# Patient Record
Sex: FEMALE | Race: BLACK OR AFRICAN AMERICAN | Employment: FULL TIME | URBAN - NONMETROPOLITAN AREA
[De-identification: names, ages, dates, MRNs, and addresses within clinical notes are randomized per-mention and may not be internally consistent; named-entity substitution may affect disease eponyms.]

---

## 2018-03-16 ENCOUNTER — HOSPITAL ENCOUNTER (OUTPATIENT)
Dept: ULTRASOUND IMAGING | Age: 28
Discharge: HOME | End: 2018-03-16
Payer: COMMERCIAL

## 2018-03-16 DIAGNOSIS — N93.8 DUB (DYSFUNCTIONAL UTERINE BLEEDING): ICD-10-CM

## 2018-03-16 PROCEDURE — 76830 TRANSVAGINAL US NON-OB: CPT

## 2019-01-24 ENCOUNTER — HOSPITAL ENCOUNTER (EMERGENCY)
Age: 29
Discharge: HOME | End: 2019-01-24
Payer: COMMERCIAL

## 2019-01-24 VITALS
HEART RATE: 76 BPM | RESPIRATION RATE: 18 BRPM | WEIGHT: 220 LBS | TEMPERATURE: 98.6 F | BODY MASS INDEX: 34.53 KG/M2 | DIASTOLIC BLOOD PRESSURE: 58 MMHG | OXYGEN SATURATION: 98 % | HEIGHT: 67 IN | SYSTOLIC BLOOD PRESSURE: 122 MMHG

## 2019-01-24 DIAGNOSIS — R11.0 NAUSEA: ICD-10-CM

## 2019-01-24 DIAGNOSIS — K59.00 CONSTIPATION, UNSPECIFIED CONSTIPATION TYPE: Primary | ICD-10-CM

## 2019-01-24 PROCEDURE — 99213 OFFICE O/P EST LOW 20 MIN: CPT | Performed by: NURSE PRACTITIONER

## 2019-01-24 PROCEDURE — 99212 OFFICE O/P EST SF 10 MIN: CPT

## 2019-01-24 RX ORDER — DOCUSATE SODIUM 100 MG/1
100 CAPSULE, LIQUID FILLED ORAL 2 TIMES DAILY PRN
Qty: 60 CAPSULE | Refills: 0 | Status: SHIPPED | OUTPATIENT
Start: 2019-01-24

## 2019-01-24 RX ORDER — NORGESTIMATE AND ETHINYL ESTRADIOL 7DAYSX3 28
KIT ORAL
COMMUNITY

## 2019-01-24 RX ORDER — ONDANSETRON 4 MG/1
4 TABLET, ORALLY DISINTEGRATING ORAL EVERY 8 HOURS PRN
Qty: 20 TABLET | Refills: 0 | Status: SHIPPED | OUTPATIENT
Start: 2019-01-24

## 2019-01-24 RX ORDER — POLYETHYLENE GLYCOL 3350 17 G/17G
17 POWDER, FOR SOLUTION ORAL DAILY PRN
Qty: 527 G | Refills: 1 | Status: SHIPPED | OUTPATIENT
Start: 2019-01-24 | End: 2019-02-23

## 2019-01-24 RX ORDER — METFORMIN HYDROCHLORIDE 500 MG/1
500 TABLET, EXTENDED RELEASE ORAL
COMMUNITY

## 2019-01-24 RX ORDER — DIVALPROEX SODIUM 125 MG/1
125 TABLET, DELAYED RELEASE ORAL 3 TIMES DAILY
COMMUNITY

## 2019-01-24 ASSESSMENT — ENCOUNTER SYMPTOMS
DIARRHEA: 0
SHORTNESS OF BREATH: 0
CONSTIPATION: 1
SORE THROAT: 0
VOMITING: 1
SINUS PRESSURE: 0
NAUSEA: 1

## 2019-04-16 ENCOUNTER — HOSPITAL ENCOUNTER (OUTPATIENT)
Age: 29
Setting detail: SPECIMEN
Discharge: HOME | End: 2019-04-16
Payer: COMMERCIAL

## 2019-04-16 LAB
ABSOLUTE EOS #: 0.17 K/UL (ref 0–0.44)
ABSOLUTE IMMATURE GRANULOCYTE: 0.03 K/UL (ref 0–0.3)
ABSOLUTE LYMPH #: 2.59 K/UL (ref 1.1–3.7)
ABSOLUTE MONO #: 0.41 K/UL (ref 0.1–1.2)
BASOPHILS # BLD: 0 % (ref 0–2)
BASOPHILS ABSOLUTE: <0.03 K/UL (ref 0–0.2)
DIFFERENTIAL TYPE: ABNORMAL
EOSINOPHILS RELATIVE PERCENT: 2 % (ref 1–4)
HCT VFR BLD CALC: 38.5 % (ref 36.3–47.1)
HEMOGLOBIN: 11.6 G/DL (ref 11.9–15.1)
IMMATURE GRANULOCYTES: 0 %
LYMPHOCYTES # BLD: 28 % (ref 24–43)
MCH RBC QN AUTO: 22 PG (ref 25.2–33.5)
MCHC RBC AUTO-ENTMCNC: 30.1 G/DL (ref 28.4–34.8)
MCV RBC AUTO: 72.9 FL (ref 82.6–102.9)
MONOCYTES # BLD: 4 % (ref 3–12)
NRBC AUTOMATED: 0 PER 100 WBC
PDW BLD-RTO: 15.6 % (ref 11.8–14.4)
PLATELET # BLD: 283 K/UL (ref 138–453)
PLATELET ESTIMATE: ABNORMAL
PMV BLD AUTO: 9.8 FL (ref 8.1–13.5)
RBC # BLD: 5.28 M/UL (ref 3.95–5.11)
RBC # BLD: ABNORMAL 10*6/UL
SEG NEUTROPHILS: 66 % (ref 36–65)
SEGMENTED NEUTROPHILS ABSOLUTE COUNT: 6.03 K/UL (ref 1.5–8.1)
WBC # BLD: 9.2 K/UL (ref 3.5–11.3)
WBC # BLD: ABNORMAL 10*3/UL

## 2019-04-17 LAB
ALBUMIN SERPL-MCNC: 3.9 G/DL (ref 3.5–5.2)
ALBUMIN/GLOBULIN RATIO: 1.3 (ref 1–2.5)
ALP BLD-CCNC: 70 U/L (ref 35–104)
ALT SERPL-CCNC: 24 U/L (ref 5–33)
ANION GAP SERPL CALCULATED.3IONS-SCNC: 14 MMOL/L (ref 9–17)
AST SERPL-CCNC: 19 U/L
BILIRUB SERPL-MCNC: 0.23 MG/DL (ref 0.3–1.2)
BUN BLDV-MCNC: 7 MG/DL (ref 6–20)
BUN/CREAT BLD: ABNORMAL (ref 9–20)
CALCIUM SERPL-MCNC: 8.9 MG/DL (ref 8.6–10.4)
CHLORIDE BLD-SCNC: 105 MMOL/L (ref 98–107)
CO2: 21 MMOL/L (ref 20–31)
COMPLEMENT C4: 37 MG/DL (ref 10–40)
CREAT SERPL-MCNC: 0.63 MG/DL (ref 0.5–0.9)
GFR AFRICAN AMERICAN: >60 ML/MIN
GFR NON-AFRICAN AMERICAN: >60 ML/MIN
GFR SERPL CREATININE-BSD FRML MDRD: ABNORMAL ML/MIN/{1.73_M2}
GFR SERPL CREATININE-BSD FRML MDRD: ABNORMAL ML/MIN/{1.73_M2}
GLUCOSE BLD-MCNC: 73 MG/DL (ref 70–99)
POTASSIUM SERPL-SCNC: 4.4 MMOL/L (ref 3.7–5.3)
SEDIMENTATION RATE, ERYTHROCYTE: 10 MM (ref 0–20)
SODIUM BLD-SCNC: 140 MMOL/L (ref 135–144)
TOTAL PROTEIN: 6.9 G/DL (ref 6.4–8.3)

## 2019-04-19 LAB — COMPLEMENT TOTAL (CH50): 176 CAE UNITS (ref 60–144)

## 2021-03-02 ENCOUNTER — HOSPITAL ENCOUNTER (OUTPATIENT)
Age: 31
Setting detail: SPECIMEN
Discharge: HOME | End: 2021-03-02
Payer: COMMERCIAL

## 2021-03-05 LAB
CULTURE: ABNORMAL
DIRECT EXAM: ABNORMAL
DIRECT EXAM: ABNORMAL
Lab: ABNORMAL
SPECIMEN DESCRIPTION: ABNORMAL

## 2021-05-22 ENCOUNTER — HOSPITAL ENCOUNTER (EMERGENCY)
Age: 31
Discharge: HOME | End: 2021-05-22
Attending: EMERGENCY MEDICINE
Payer: COMMERCIAL

## 2021-05-22 VITALS
TEMPERATURE: 97.9 F | BODY MASS INDEX: 35.47 KG/M2 | RESPIRATION RATE: 18 BRPM | OXYGEN SATURATION: 99 % | WEIGHT: 226 LBS | DIASTOLIC BLOOD PRESSURE: 76 MMHG | HEIGHT: 67 IN | HEART RATE: 77 BPM | SYSTOLIC BLOOD PRESSURE: 131 MMHG

## 2021-05-22 DIAGNOSIS — L02.411 ABSCESS OF AXILLA, RIGHT: Primary | ICD-10-CM

## 2021-05-22 DIAGNOSIS — L73.2 AXILLARY HIDRADENITIS SUPPURATIVA: ICD-10-CM

## 2021-05-22 LAB
ANION GAP SERPL CALCULATED.3IONS-SCNC: 7 MEQ/L (ref 8–16)
BASOPHILS # BLD: 0.1 %
BASOPHILS ABSOLUTE: 0 THOU/MM3 (ref 0–0.1)
BUN BLDV-MCNC: 8 MG/DL (ref 7–22)
CALCIUM SERPL-MCNC: 8.9 MG/DL (ref 8.5–10.5)
CHLORIDE BLD-SCNC: 106 MEQ/L (ref 98–111)
CO2: 25 MEQ/L (ref 23–33)
CREAT SERPL-MCNC: 0.8 MG/DL (ref 0.4–1.2)
EOSINOPHIL # BLD: 4.6 %
EOSINOPHILS ABSOLUTE: 0.7 THOU/MM3 (ref 0–0.4)
ERYTHROCYTE [DISTWIDTH] IN BLOOD BY AUTOMATED COUNT: 15 % (ref 11.5–14.5)
ERYTHROCYTE [DISTWIDTH] IN BLOOD BY AUTOMATED COUNT: 39.5 FL (ref 35–45)
GFR SERPL CREATININE-BSD FRML MDRD: > 90 ML/MIN/1.73M2
GLUCOSE BLD-MCNC: 83 MG/DL (ref 70–108)
HCT VFR BLD CALC: 39.7 % (ref 37–47)
HEMOGLOBIN: 12.1 GM/DL (ref 12–16)
IMMATURE GRANS (ABS): 0.05 THOU/MM3 (ref 0–0.07)
IMMATURE GRANULOCYTES: 0.3 %
LYMPHOCYTES # BLD: 20.9 %
LYMPHOCYTES ABSOLUTE: 3.2 THOU/MM3 (ref 1–4.8)
MCH RBC QN AUTO: 22.5 PG (ref 26–33)
MCHC RBC AUTO-ENTMCNC: 30.5 GM/DL (ref 32.2–35.5)
MCV RBC AUTO: 73.9 FL (ref 81–99)
MONOCYTES # BLD: 5.7 %
MONOCYTES ABSOLUTE: 0.9 THOU/MM3 (ref 0.4–1.3)
NUCLEATED RED BLOOD CELLS: 0 /100 WBC
OSMOLALITY CALCULATION: 273.1 MOSMOL/KG (ref 275–300)
PLATELET # BLD: 306 THOU/MM3 (ref 130–400)
PMV BLD AUTO: 9.2 FL (ref 9.4–12.4)
POTASSIUM SERPL-SCNC: 4.4 MEQ/L (ref 3.5–5.2)
RBC # BLD: 5.37 MILL/MM3 (ref 4.2–5.4)
SEG NEUTROPHILS: 68.4 %
SEGMENTED NEUTROPHILS ABSOLUTE COUNT: 10.5 THOU/MM3 (ref 1.8–7.7)
SODIUM BLD-SCNC: 138 MEQ/L (ref 135–145)
WBC # BLD: 15.3 THOU/MM3 (ref 4.8–10.8)

## 2021-05-22 PROCEDURE — 80048 BASIC METABOLIC PNL TOTAL CA: CPT

## 2021-05-22 PROCEDURE — 10060 I&D ABSCESS SIMPLE/SINGLE: CPT

## 2021-05-22 PROCEDURE — 36415 COLL VENOUS BLD VENIPUNCTURE: CPT

## 2021-05-22 PROCEDURE — 99283 EMERGENCY DEPT VISIT LOW MDM: CPT

## 2021-05-22 PROCEDURE — 85025 COMPLETE CBC W/AUTO DIFF WBC: CPT

## 2021-05-22 PROCEDURE — 6370000000 HC RX 637 (ALT 250 FOR IP): Performed by: EMERGENCY MEDICINE

## 2021-05-22 RX ORDER — CLINDAMYCIN HYDROCHLORIDE 150 MG/1
300 CAPSULE ORAL ONCE
Status: COMPLETED | OUTPATIENT
Start: 2021-05-22 | End: 2021-05-22

## 2021-05-22 RX ORDER — HYDROCODONE BITARTRATE AND ACETAMINOPHEN 5; 325 MG/1; MG/1
1 TABLET ORAL ONCE
Status: COMPLETED | OUTPATIENT
Start: 2021-05-22 | End: 2021-05-22

## 2021-05-22 RX ORDER — CLINDAMYCIN HYDROCHLORIDE 300 MG/1
300 CAPSULE ORAL 3 TIMES DAILY
Qty: 30 CAPSULE | Refills: 0 | Status: SHIPPED | OUTPATIENT
Start: 2021-05-22 | End: 2021-06-01

## 2021-05-22 RX ADMIN — CLINDAMYCIN HYDROCHLORIDE 300 MG: 150 CAPSULE ORAL at 19:21

## 2021-05-22 RX ADMIN — HYDROCODONE BITARTRATE AND ACETAMINOPHEN 1 TABLET: 5; 325 TABLET ORAL at 18:29

## 2021-05-22 ASSESSMENT — ENCOUNTER SYMPTOMS
COUGH: 0
VOMITING: 0
ABDOMINAL PAIN: 0
SORE THROAT: 0
CONSTIPATION: 0
VOICE CHANGE: 0
DIARRHEA: 0
NAUSEA: 0
CHEST TIGHTNESS: 0
SINUS PRESSURE: 0
SHORTNESS OF BREATH: 0
TROUBLE SWALLOWING: 0
WHEEZING: 0
RHINORRHEA: 0
BACK PAIN: 0

## 2021-05-22 ASSESSMENT — PAIN SCALES - GENERAL: PAINLEVEL_OUTOF10: 4

## 2021-05-22 ASSESSMENT — PAIN DESCRIPTION - DESCRIPTORS: DESCRIPTORS: CONSTANT;TENDER

## 2021-05-22 ASSESSMENT — PAIN DESCRIPTION - PAIN TYPE: TYPE: ACUTE PAIN

## 2021-05-22 ASSESSMENT — PAIN DESCRIPTION - FREQUENCY: FREQUENCY: CONTINUOUS

## 2021-05-22 NOTE — ED PROVIDER NOTES
[facility] [address]        CHIEF COMPLAINT    Chief Complaint   Patient presents with    Abscess       Nurses Notes reviewed and I agree except as noted in the HPI. HPI    Billy Andino is a 27 y.o. female who presents for evaluation of abscess on the left axilla which developed overnight. Patient had history of hidradenitis suppurativa. Patient went to the [facility] clinic and was directed here to be evaluated's the axilla had some redness. Patient did not have any fever, no chills, no shortness of breath, no chest pain. Patient is currently on doxycycline. REVIEW OF SYSTEMS    Review of Systems   Constitutional: Negative for appetite change, chills, diaphoresis, fatigue and fever. HENT: Negative for congestion, ear pain, postnasal drip, rhinorrhea, sinus pressure, sneezing, sore throat, trouble swallowing and voice change. Respiratory: Negative for cough, chest tightness, shortness of breath and wheezing. Cardiovascular: Negative for chest pain, palpitations and leg swelling. Gastrointestinal: Negative for abdominal pain, constipation, diarrhea, nausea and vomiting. Musculoskeletal: Negative for arthralgias, back pain, joint swelling, myalgias, neck pain and neck stiffness. Right axillary abscess   Neurological: Negative for dizziness, syncope, weakness, light-headedness, numbness and headaches. PAST MEDICAL HISTORY     has a past medical history of Anxiety, Bipolar 1 disorder ([address] Diabetes mellitus ([address] and Headache(784.0). SURGICAL HISTORY   has no past surgical history on file.     CURRENT MEDICATIONS    Previous Medications    DIVALPROEX (DEPAKOTE) 125 MG DR TABLET    Take 125 mg by mouth 3 times daily    DOCUSATE SODIUM (COLACE) 100 MG CAPSULE    Take 1 capsule by mouth 2 times daily as needed for Constipation    METFORMIN (GLUCOPHAGE-XR) 500 MG EXTENDED RELEASE TABLET    Take 500 mg by mouth daily (with breakfast)    NORGESTIM-ETH ESTRAD TRIPHASIC (TRI-SPRINTEC) 0.18/0.215/0.25 MG-35 MCG TABS    Take by mouth    ONDANSETRON (ZOFRAN ODT) 4 MG DISINTEGRATING TABLET    Take 1 tablet by mouth every 8 hours as needed for Nausea or Vomiting       ALLERGIES    has No Known Allergies. FAMILY HISTORY    She indicated that her mother is alive. She indicated that her father is alive. family history includes Hypertension in her mother; Thyroid Disease in her mother. SOCIAL HISTORY     reports that she has never smoked. She has never used smokeless tobacco. She reports current alcohol use. She reports that she does not use drugs. PHYSICAL EXAM      INITIAL VITALS: /76   Pulse 77   Temp 97.9 °F (36.6 °C) (Oral)   Resp 18   Ht 5' 7\" (1.702 m)   Wt 226 lb (102.5 kg)   SpO2 99%   BMI 35.40 kg/m² Estimated body mass index is 35.4 kg/m² as calculated from the following:    Height as of this encounter: 5' 7\" (1.702 m). Weight as of this encounter: 226 lb (102.5 kg). Physical Exam  Vitals reviewed. Constitutional:       Appearance: She is well-developed. HENT:      Head: Normocephalic and atraumatic. Right Ear: External ear normal.      Left Ear: External ear normal.      Nose: Nose normal.   Eyes:      General: No scleral icterus. Conjunctiva/sclera: Conjunctivae normal.      Pupils: Pupils are equal, round, and reactive to light. Neck:      Thyroid: No thyromegaly. Vascular: No JVD. Cardiovascular:      Rate and Rhythm: Normal rate and regular rhythm. Heart sounds: No murmur heard. No friction rub. Pulmonary:      Effort: Pulmonary effort is normal.      Breath sounds: Normal breath sounds. No wheezing or rales. Chest:      Chest wall: No tenderness. Abdominal:      General: Bowel sounds are normal.      Palpations: Abdomen is soft. There is no mass. Tenderness: There is no abdominal tenderness.    Musculoskeletal:      Cervical back: Normal range of motion and neck supple. Comments: Right axilla showed a 1 inch x 1-1/2 inch fluctuant mass consistent with an abscess   Lymphadenopathy:      Cervical: No cervical adenopathy. Skin:     Findings: No rash. Neurological:      Mental Status: She is alert and oriented to person, place, and time. Psychiatric:         Behavior: Behavior is cooperative. MEDICAL DECISION MAKING    DIFFERENTIAL DIAGNOSIS:  Axillary abscess, history of hidradenitis suppurativa      DIAGNOSTIC RESULTS      RADIOLOGY:    No orders to display       LABS:   Labs Reviewed   CBC WITH AUTO DIFFERENTIAL - Abnormal; Notable for the following components:       Result Value    WBC 15.3 (*)     MCV 73.9 (*)     MCH 22.5 (*)     MCHC 30.5 (*)     RDW-CV 15.0 (*)     MPV 9.2 (*)     Segs Absolute 10.5 (*)     Eosinophils Absolute 0.7 (*)     All other components within normal limits   ANION GAP - Abnormal; Notable for the following components:    Anion Gap 7.0 (*)     All other components within normal limits   OSMOLALITY - Abnormal; Notable for the following components:    Osmolality Calc 273.1 (*)     All other components within normal limits   BASIC METABOLIC PANEL   GLOMERULAR FILTRATION RATE, ESTIMATED     All other unresulted laboratory test above are normal:    Vitals:    Vitals:    05/22/21 1531   BP: 131/76   Pulse: 77   Resp: 18   Temp: 97.9 °F (36.6 °C)   TempSrc: Oral   SpO2: 99%   Weight: 226 lb (102.5 kg)   Height: 5' 7\" (1.702 m)       EMERGENCY DEPARTMENT COURSE:    Medications   vancomycin (VANCOCIN) 2,000 mg in dextrose 5 % 500 mL IVPB (has no administration in time range)   clindamycin (CLEOCIN) capsule 300 mg (has no administration in time range)   HYDROcodone-acetaminophen (NORCO) 5-325 MG per tablet 1 tablet (1 tablet Oral Given 5/22/21 1829)       The pt was seen and evaluated by me. Within the department, I observed the pt's vitalsigns to be within acceptable range.  Laboratory studies were performed, results were reviewed with the patient. Within the department, the pt was treated with clindamycin and Norco as the patient refusing vancomycin because of time constraints, she has to  children. I observed the pt's condition to be hemodynamically stable during the duration of their stay. I explained my proposed course of treatment to the pt, and they were amenable to my decision. They were discharged home, and they will return to the ED if their symptoms become more severein nature, or otherwise change. Controlled Substances Monitoring:        CRITICAL CARE:   None. CONSULTS:  None    PROCEDURES:Incision and Drainage Procedure Note done by me: Indication: axillary abscess  Procedure: The patient was positioned appropriately and the skin over the incision site was prepped with betadine and draped in a sterile fashion. Local anesthesia was obtained by infiltration using 1% Lidocaine without epinephrine. A 1 cm incision was then made over the greatest area of fluctuance and immediately creamy and yellow material flowed out freely and the rest by gentle compression. The drainage cavity was then packed with sterile Iodoform gauze and dressed with a sterile dressing, gauze and tape. The patient tolerated the procedure well. Complications: None      FINAL IMPRESSION       1. Abscess of axilla, right    2. Axillary hidradenitis suppurativa          DISPOSITION/PLAN  PATIENT REFERRED TO:  Washington Mccarthy, APRN - CNP  [address]  [phone]    Schedule an appointment as soon as possible for a visit in 3 days      DISCHARGE MEDICATIONS:  New Prescriptions    CLINDAMYCIN (CLEOCIN) 300 MG CAPSULE    Take 1 capsule by mouth 3 times daily for 10 days 1 tablet 3 times a day for 10 days         (Please note that portions of this note were completed with a voice recognition program and electronically transcribed.   Efforts were [facility] edit the dictations but occasionally words are mis-transcribed . The transcription may contain errors not detected in proofreading.   This transcription was electronically signed.)     05/22/21 7:15 PM      Brandi Ohara MD      Emergency room physician           Brandi Ohara MD  05/22/21 6162

## 2021-05-22 NOTE — ED NOTES
Pt presents to the ed with c/o abscess to the inner right armpit area. PT states that she was sent here from [facility] for IV abts.      Erich Vargas [place]  59/66/15 7878

## 2021-05-22 NOTE — ED NOTES
Pt refusing IV antibiotics as she is unable to wait for the infusion time. Dr Bindu Baldwin made aware.       Katey Butler RN  05/22/21 3297

## 2021-06-24 ENCOUNTER — HOSPITAL ENCOUNTER (OUTPATIENT)
Age: 31
Setting detail: SPECIMEN
Discharge: HOME | End: 2021-06-24
Payer: COMMERCIAL

## 2021-06-27 LAB
CULTURE: ABNORMAL
DIRECT EXAM: ABNORMAL
Lab: ABNORMAL
SPECIMEN DESCRIPTION: ABNORMAL

## 2021-07-30 ENCOUNTER — HOSPITAL ENCOUNTER (OUTPATIENT)
Age: 31
Setting detail: SPECIMEN
Discharge: HOME | End: 2021-07-30
Payer: COMMERCIAL

## 2021-07-31 LAB
DIRECT EXAM: NORMAL
Lab: NORMAL
SPECIMEN DESCRIPTION: NORMAL

## 2021-08-03 LAB
CHLAMYDIA BY THIN PREP: NEGATIVE
HPV SAMPLE: NORMAL
HPV, GENOTYPE 16: NOT DETECTED
HPV, GENOTYPE 18: NOT DETECTED
HPV, HIGH RISK OTHER: NOT DETECTED
HPV, INTERPRETATION: NORMAL
N. GONORRHOEAE DNA, THIN PREP: NEGATIVE
SPECIMEN DESCRIPTION: NORMAL
SPECIMEN DESCRIPTION: NORMAL

## 2021-08-04 ENCOUNTER — HOSPITAL ENCOUNTER (OUTPATIENT)
Age: 31
Setting detail: SPECIMEN
Discharge: HOME | End: 2021-08-04
Payer: COMMERCIAL

## 2021-08-04 LAB
ABSOLUTE EOS #: 0.25 K/UL (ref 0–0.44)
ABSOLUTE IMMATURE GRANULOCYTE: 0.03 K/UL (ref 0–0.3)
ABSOLUTE LYMPH #: 2.61 K/UL (ref 1.1–3.7)
ABSOLUTE MONO #: 0.51 K/UL (ref 0.1–1.2)
ALBUMIN SERPL-MCNC: 3.9 G/DL (ref 3.5–5.2)
ALBUMIN/GLOBULIN RATIO: 1.1 (ref 1–2.5)
ALP BLD-CCNC: 77 U/L (ref 35–104)
ALT SERPL-CCNC: 18 U/L (ref 5–33)
ANION GAP SERPL CALCULATED.3IONS-SCNC: 13 MMOL/L (ref 9–17)
AST SERPL-CCNC: 19 U/L
BASOPHILS # BLD: 0 % (ref 0–2)
BASOPHILS ABSOLUTE: <0.03 K/UL (ref 0–0.2)
BILIRUB SERPL-MCNC: 0.42 MG/DL (ref 0.3–1.2)
BUN BLDV-MCNC: 11 MG/DL (ref 6–20)
BUN/CREAT BLD: NORMAL (ref 9–20)
CALCIUM SERPL-MCNC: 8.9 MG/DL (ref 8.6–10.4)
CHLORIDE BLD-SCNC: 105 MMOL/L (ref 98–107)
CHOLESTEROL/HDL RATIO: 4.3
CHOLESTEROL: 178 MG/DL
CO2: 21 MMOL/L (ref 20–31)
CREAT SERPL-MCNC: 0.73 MG/DL (ref 0.5–0.9)
DIFFERENTIAL TYPE: ABNORMAL
EOSINOPHILS RELATIVE PERCENT: 3 % (ref 1–4)
GFR AFRICAN AMERICAN: >60 ML/MIN
GFR NON-AFRICAN AMERICAN: >60 ML/MIN
GFR SERPL CREATININE-BSD FRML MDRD: NORMAL ML/MIN/{1.73_M2}
GFR SERPL CREATININE-BSD FRML MDRD: NORMAL ML/MIN/{1.73_M2}
GLUCOSE BLD-MCNC: 95 MG/DL (ref 70–99)
HCT VFR BLD CALC: 41.3 % (ref 36.3–47.1)
HDLC SERPL-MCNC: 41 MG/DL
HEMOGLOBIN: 12.3 G/DL (ref 11.9–15.1)
IMMATURE GRANULOCYTES: 0 %
LDL CHOLESTEROL: 126 MG/DL (ref 0–130)
LYMPHOCYTES # BLD: 26 % (ref 24–43)
MCH RBC QN AUTO: 21.8 PG (ref 25.2–33.5)
MCHC RBC AUTO-ENTMCNC: 29.8 G/DL (ref 28.4–34.8)
MCV RBC AUTO: 73.1 FL (ref 82.6–102.9)
MONOCYTES # BLD: 5 % (ref 3–12)
NRBC AUTOMATED: 0 PER 100 WBC
PDW BLD-RTO: 13.9 % (ref 11.8–14.4)
PLATELET # BLD: 378 K/UL (ref 138–453)
PLATELET ESTIMATE: ABNORMAL
PMV BLD AUTO: 9.6 FL (ref 8.1–13.5)
POTASSIUM SERPL-SCNC: 4.1 MMOL/L (ref 3.7–5.3)
RBC # BLD: 5.65 M/UL (ref 3.95–5.11)
RBC # BLD: ABNORMAL 10*6/UL
SEG NEUTROPHILS: 66 % (ref 36–65)
SEGMENTED NEUTROPHILS ABSOLUTE COUNT: 6.51 K/UL (ref 1.5–8.1)
SODIUM BLD-SCNC: 139 MMOL/L (ref 135–144)
TOTAL PROTEIN: 7.3 G/DL (ref 6.4–8.3)
TRIGL SERPL-MCNC: 57 MG/DL
TSH SERPL DL<=0.05 MIU/L-ACNC: 1.22 MIU/L (ref 0.3–5)
VLDLC SERPL CALC-MCNC: NORMAL MG/DL (ref 1–30)
WBC # BLD: 9.9 K/UL (ref 3.5–11.3)
WBC # BLD: ABNORMAL 10*3/UL

## 2021-08-05 LAB
ESTIMATED AVERAGE GLUCOSE: 134 MG/DL
HBA1C MFR BLD: 6.3 % (ref 4–6)

## 2021-08-11 LAB — CYTOLOGY REPORT: NORMAL

## 2022-03-14 ENCOUNTER — HOSPITAL ENCOUNTER (OUTPATIENT)
Age: 32
Setting detail: SPECIMEN
Discharge: HOME | End: 2022-03-14

## 2022-03-15 LAB
HBV SURFACE AB TITR SER: <3.5 MIU/ML
HEPATITIS B CORE TOTAL ANTIBODY: NONREACTIVE
HEPATITIS B SURFACE ANTIGEN: NONREACTIVE

## 2022-03-17 LAB
QUANTI TB GOLD PLUS: NEGATIVE
QUANTI TB1 MINUS NIL: 0 IU/ML (ref 0–0.34)
QUANTI TB2 MINUS NIL: 0.01 IU/ML (ref 0–0.34)
QUANTIFERON MITOGEN: >10 IU/ML
QUANTIFERON NIL: 0.01 IU/ML

## 2022-11-14 ENCOUNTER — HOSPITAL ENCOUNTER (OUTPATIENT)
Age: 32
Setting detail: SPECIMEN
Discharge: HOME | End: 2022-11-14

## 2022-11-14 LAB
ABSOLUTE EOS #: 0.26 K/UL (ref 0–0.44)
ABSOLUTE IMMATURE GRANULOCYTE: 0.03 K/UL (ref 0–0.3)
ABSOLUTE LYMPH #: 3.08 K/UL (ref 1.1–3.7)
ABSOLUTE MONO #: 0.41 K/UL (ref 0.1–1.2)
ALBUMIN SERPL-MCNC: 3.7 G/DL (ref 3.5–5.2)
ALBUMIN/GLOBULIN RATIO: 1.2 (ref 1–2.5)
ALP BLD-CCNC: 65 U/L (ref 35–104)
ALT SERPL-CCNC: 20 U/L (ref 5–33)
ANION GAP SERPL CALCULATED.3IONS-SCNC: 11 MMOL/L (ref 9–17)
AST SERPL-CCNC: 17 U/L
BASOPHILS # BLD: 0 % (ref 0–2)
BASOPHILS ABSOLUTE: <0.03 K/UL (ref 0–0.2)
BILIRUB SERPL-MCNC: 0.2 MG/DL (ref 0.3–1.2)
BUN BLDV-MCNC: 8 MG/DL (ref 6–20)
CALCIUM SERPL-MCNC: 8.6 MG/DL (ref 8.6–10.4)
CHLORIDE BLD-SCNC: 104 MMOL/L (ref 98–107)
CHOLESTEROL/HDL RATIO: 3.9
CHOLESTEROL: 154 MG/DL
CO2: 23 MMOL/L (ref 20–31)
CREAT SERPL-MCNC: 0.75 MG/DL (ref 0.5–0.9)
EOSINOPHILS RELATIVE PERCENT: 3 % (ref 1–4)
FERRITIN: 16 NG/ML (ref 13–150)
FOLATE: 2.6 NG/ML
GFR SERPL CREATININE-BSD FRML MDRD: >60 ML/MIN/1.73M2
GLUCOSE BLD-MCNC: 96 MG/DL (ref 70–99)
HCT VFR BLD CALC: 37.4 % (ref 36.3–47.1)
HDLC SERPL-MCNC: 40 MG/DL
HEMOGLOBIN: 11.5 G/DL (ref 11.9–15.1)
IMMATURE GRANULOCYTES: 0 %
IRON SATURATION: 19 % (ref 20–55)
IRON: 59 UG/DL (ref 37–145)
LDL CHOLESTEROL: 98 MG/DL (ref 0–130)
LYMPHOCYTES # BLD: 37 % (ref 24–43)
MAGNESIUM: 1.8 MG/DL (ref 1.6–2.6)
MCH RBC QN AUTO: 22.4 PG (ref 25.2–33.5)
MCHC RBC AUTO-ENTMCNC: 30.7 G/DL (ref 28.4–34.8)
MCV RBC AUTO: 72.9 FL (ref 82.6–102.9)
MONOCYTES # BLD: 5 % (ref 3–12)
NRBC AUTOMATED: 0 PER 100 WBC
PDW BLD-RTO: 14 % (ref 11.8–14.4)
PLATELET # BLD: 331 K/UL (ref 138–453)
PMV BLD AUTO: 9.5 FL (ref 8.1–13.5)
POTASSIUM SERPL-SCNC: 3.8 MMOL/L (ref 3.7–5.3)
RBC # BLD: 5.13 M/UL (ref 3.95–5.11)
RBC # BLD: ABNORMAL 10*6/UL
SEG NEUTROPHILS: 55 % (ref 36–65)
SEGMENTED NEUTROPHILS ABSOLUTE COUNT: 4.61 K/UL (ref 1.5–8.1)
SODIUM BLD-SCNC: 138 MMOL/L (ref 135–144)
TOTAL IRON BINDING CAPACITY: 309 UG/DL (ref 250–450)
TOTAL PROTEIN: 6.9 G/DL (ref 6.4–8.3)
TRIGL SERPL-MCNC: 78 MG/DL
TSH SERPL DL<=0.05 MIU/L-ACNC: 2.21 UIU/ML (ref 0.3–5)
UNSATURATED IRON BINDING CAPACITY: 250 UG/DL (ref 112–347)
VITAMIN B-12: 513 PG/ML (ref 232–1245)
WBC # BLD: 8.4 K/UL (ref 3.5–11.3)

## 2022-11-15 LAB
ESTIMATED AVERAGE GLUCOSE: 120 MG/DL
HBA1C MFR BLD: 5.8 % (ref 4–6)
VITAMIN D 25-HYDROXY: <6 NG/ML

## 2023-02-21 ENCOUNTER — HOSPITAL ENCOUNTER (OUTPATIENT)
Age: 33
Setting detail: SPECIMEN
Discharge: HOME | End: 2023-02-21

## 2023-02-23 LAB
MICROORGANISM SPEC CULT: ABNORMAL
MICROORGANISM/AGENT SPEC: ABNORMAL
SPECIMEN DESCRIPTION: ABNORMAL

## 2023-07-21 ENCOUNTER — HOSPITAL ENCOUNTER (EMERGENCY)
Age: 33
Discharge: HOME | End: 2023-07-21
Payer: OTHER MISCELLANEOUS

## 2023-07-21 VITALS
SYSTOLIC BLOOD PRESSURE: 136 MMHG | HEART RATE: 86 BPM | TEMPERATURE: 98.5 F | HEIGHT: 66 IN | RESPIRATION RATE: 18 BRPM | WEIGHT: 230 LBS | BODY MASS INDEX: 36.96 KG/M2 | OXYGEN SATURATION: 99 % | DIASTOLIC BLOOD PRESSURE: 91 MMHG

## 2023-07-21 DIAGNOSIS — S29.012A UPPER BACK STRAIN, INITIAL ENCOUNTER: ICD-10-CM

## 2023-07-21 DIAGNOSIS — S16.1XXA ACUTE STRAIN OF NECK MUSCLE, INITIAL ENCOUNTER: ICD-10-CM

## 2023-07-21 DIAGNOSIS — S63.502A SPRAIN OF LEFT WRIST, INITIAL ENCOUNTER: ICD-10-CM
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
## 2023-07-21 PROCEDURE — 99283 EMERGENCY DEPT VISIT LOW MDM: CPT

## 2023-07-21 RX ORDER — NAPROXEN 500 MG/1
500 TABLET ORAL 2 TIMES DAILY
Qty: 20 TABLET | Refills: 0 | Status: SHIPPED | OUTPATIENT
Start: 2023-07-21

## 2023-07-21 RX ORDER — ADALIMUMAB 40MG/0.4ML
KIT SUBCUTANEOUS
COMMUNITY

## 2023-07-21 RX ORDER — CYCLOBENZAPRINE HCL 10 MG
10 TABLET ORAL 3 TIMES DAILY PRN
Qty: 21 TABLET | Refills: 0 | Status: SHIPPED | OUTPATIENT
Start: 2023-07-21 | End: 2023-07-31

## 2023-07-21 ASSESSMENT — PAIN DESCRIPTION - FREQUENCY: FREQUENCY: CONTINUOUS

## 2023-07-21 ASSESSMENT — PAIN DESCRIPTION - ONSET: ONSET: ON-GOING

## 2023-07-21 ASSESSMENT — PAIN DESCRIPTION - DESCRIPTORS: DESCRIPTORS: ACHING

## 2023-07-21 ASSESSMENT — PAIN DESCRIPTION - LOCATION: LOCATION: HEAD

## 2023-07-21 ASSESSMENT — PAIN - FUNCTIONAL ASSESSMENT
PAIN_FUNCTIONAL_ASSESSMENT: 0-10
PAIN_FUNCTIONAL_ASSESSMENT: ACTIVITIES ARE NOT PREVENTED

## 2023-07-21 ASSESSMENT — LIFESTYLE VARIABLES
HOW MANY STANDARD DRINKS CONTAINING ALCOHOL DO YOU HAVE ON A TYPICAL DAY: 1 OR 2
HOW OFTEN DO YOU HAVE A DRINK CONTAINING ALCOHOL: MONTHLY OR LESS

## 2023-07-21 ASSESSMENT — PAIN DESCRIPTION - PAIN TYPE: TYPE: ACUTE PAIN

## 2023-07-21 ASSESSMENT — PAIN SCALES - GENERAL: PAINLEVEL_OUTOF10: 3

## 2023-07-21 NOTE — ED NOTES
Pt discharged in satisfactory condition, ambulated out of department with friend at this time.      Steph Bautista RN  07/21/23 0627
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
## 2023-08-15 ENCOUNTER — HOSPITAL ENCOUNTER (OUTPATIENT)
Age: 33
Setting detail: SPECIMEN
Discharge: HOME | End: 2023-08-15

## 2023-08-17 LAB
MICROORGANISM SPEC CULT: ABNORMAL
MICROORGANISM/AGENT SPEC: ABNORMAL
MICROORGANISM/AGENT SPEC: ABNORMAL
SPECIMEN DESCRIPTION: ABNORMAL

## 2023-09-01 ENCOUNTER — HOSPITAL ENCOUNTER (EMERGENCY)
Age: 33
Discharge: HOME | End: 2023-09-01
Payer: COMMERCIAL

## 2023-09-01 ENCOUNTER — APPOINTMENT (OUTPATIENT)
Dept: GENERAL RADIOLOGY | Age: 33
End: 2023-09-01
Payer: COMMERCIAL

## 2023-09-01 VITALS
HEART RATE: 78 BPM | RESPIRATION RATE: 18 BRPM | SYSTOLIC BLOOD PRESSURE: 127 MMHG | DIASTOLIC BLOOD PRESSURE: 94 MMHG | OXYGEN SATURATION: 98 % | TEMPERATURE: 98.7 F

## 2023-09-01 DIAGNOSIS — S93.402A SPRAIN OF LEFT ANKLE, UNSPECIFIED LIGAMENT, INITIAL ENCOUNTER: Primary | ICD-10-CM

## 2023-09-01 PROCEDURE — 73630 X-RAY EXAM OF FOOT: CPT

## 2023-09-01 PROCEDURE — 99213 OFFICE O/P EST LOW 20 MIN: CPT

## 2023-09-01 PROCEDURE — 73610 X-RAY EXAM OF ANKLE: CPT

## 2023-09-01 PROCEDURE — 99203 OFFICE O/P NEW LOW 30 MIN: CPT | Performed by: NURSE PRACTITIONER

## 2023-09-01 RX ORDER — AMOXICILLIN AND CLAVULANATE POTASSIUM 875; 125 MG/1; MG/1
1 TABLET, FILM COATED ORAL 2 TIMES DAILY
COMMUNITY

## 2023-09-01 ASSESSMENT — PAIN SCALES - GENERAL: PAINLEVEL_OUTOF10: 4

## 2023-09-01 ASSESSMENT — ENCOUNTER SYMPTOMS
VOMITING: 0
NAUSEA: 0
SHORTNESS OF BREATH: 0

## 2023-09-01 ASSESSMENT — PAIN - FUNCTIONAL ASSESSMENT: PAIN_FUNCTIONAL_ASSESSMENT: 0-10

## 2023-09-01 ASSESSMENT — PAIN DESCRIPTION - ORIENTATION: ORIENTATION: LEFT

## 2023-09-01 ASSESSMENT — PAIN DESCRIPTION - LOCATION: LOCATION: FOOT;ANKLE

## 2023-09-01 NOTE — ED PROVIDER NOTES
[address]  Urgent Care Encounter       CHIEF COMPLAINT       Chief Complaint   Patient presents with    Ankle Injury       Nurses Notes reviewed and I agree except as noted in the HPI. HISTORY OF PRESENT ILLNESS   Kristi Ellis is a 28 y.o. female who presents for evaluation of left ankle and foot pain/swelling. Patient states this been ongoing since this morning. She states that she was walking to her car this morning when her ankle suddenly just \"rolled\" on her. States that she has tried Tylenol and ibuprofen and has wrapped her ankle in an Ace wrap but continues to have pain. The history is provided by the patient. REVIEW OF SYSTEMS     Review of Systems   Constitutional:  Negative for chills and fever. Respiratory:  Negative for shortness of breath. Cardiovascular:  Negative for chest pain. Gastrointestinal:  Negative for nausea and vomiting. Musculoskeletal:  Positive for arthralgias and joint swelling. Negative for gait problem and myalgias. Skin:  Negative for rash. Neurological:  Negative for weakness and numbness. Hematological:  Does not bruise/bleed easily. PAST MEDICAL HISTORY         Diagnosis Date    Anxiety 02/2014    Bipolar 1 disorder ([address] [facility])     Diabetes mellitus ([address])     Headache(784.0)     as an child    Hidradenitis suppurativa     PCOS (polycystic ovarian syndrome)        SURGICALHISTORY     Patient  has no past surgical history on file.     CURRENT MEDICATIONS       Previous Medications    ADALIMUMAB (HUMIRA PEN) 40 MG/0.4ML PNKT    Inject into the skin every 14 days    AMOXICILLIN-CLAVULANATE (AUGMENTIN) 875-125 MG PER TABLET    Take 1 tablet by mouth 2 times daily    NAPROXEN (NAPROSYN) 500 MG TABLET    Take 1 tablet by mouth 2 times daily    NORGESTIM-ETH ESTRAD TRIPHASIC 0.18/0.215/0.25 MG-35 MCG TABS    Take by mouth    ONDANSETRON (ZOFRAN ODT) 4 MG DISINTEGRATING TABLET    Take 1 tablet by mouth every 8 hours as needed for

## 2024-06-23 ENCOUNTER — APPOINTMENT (OUTPATIENT)
Dept: CT IMAGING | Age: 34
End: 2024-06-23
Payer: COMMERCIAL

## 2024-06-23 ENCOUNTER — HOSPITAL ENCOUNTER (EMERGENCY)
Age: 34
Discharge: HOME | End: 2024-06-24
Payer: COMMERCIAL

## 2024-06-23 VITALS
RESPIRATION RATE: 18 BRPM | SYSTOLIC BLOOD PRESSURE: 136 MMHG | TEMPERATURE: 98.4 F | WEIGHT: 230 LBS | HEART RATE: 97 BPM | HEIGHT: 67 IN | OXYGEN SATURATION: 99 % | DIASTOLIC BLOOD PRESSURE: 85 MMHG | BODY MASS INDEX: 36.1 KG/M2

## 2024-06-23 DIAGNOSIS — N61.1 BREAST ABSCESS: Primary | ICD-10-CM

## 2024-06-23 LAB
ALBUMIN SERPL BCG-MCNC: 3.6 G/DL (ref 3.5–5.1)
ALP SERPL-CCNC: 85 U/L (ref 38–126)
ALT SERPL W/O P-5'-P-CCNC: 12 U/L (ref 11–66)
ANION GAP SERPL CALC-SCNC: 11 MEQ/L (ref 8–16)
AST SERPL-CCNC: 11 U/L (ref 5–40)
B-HCG SERPL QL: NEGATIVE
BASOPHILS ABSOLUTE: 0 THOU/MM3 (ref 0–0.1)
BASOPHILS NFR BLD AUTO: 0.3 %
BILIRUB SERPL-MCNC: 0.2 MG/DL (ref 0.3–1.2)
BUN SERPL-MCNC: 10 MG/DL (ref 7–22)
CALCIUM SERPL-MCNC: 8.7 MG/DL (ref 8.5–10.5)
CHLORIDE SERPL-SCNC: 101 MEQ/L (ref 98–111)
CO2 SERPL-SCNC: 24 MEQ/L (ref 23–33)
CREAT SERPL-MCNC: 0.8 MG/DL (ref 0.4–1.2)
DEPRECATED RDW RBC AUTO: 38 FL (ref 35–45)
EOSINOPHIL NFR BLD AUTO: 1.9 %
EOSINOPHILS ABSOLUTE: 0.2 THOU/MM3 (ref 0–0.4)
ERYTHROCYTE [DISTWIDTH] IN BLOOD BY AUTOMATED COUNT: 15.2 % (ref 11.5–14.5)
GFR SERPL CREATININE-BSD FRML MDRD: > 90 ML/MIN/1.73M2
GLUCOSE SERPL-MCNC: 108 MG/DL (ref 70–108)
HCT VFR BLD AUTO: 36 % (ref 37–47)
HGB BLD-MCNC: 11.3 GM/DL (ref 12–16)
IMM GRANULOCYTES # BLD AUTO: 0.03 THOU/MM3 (ref 0–0.07)
IMM GRANULOCYTES NFR BLD AUTO: 0.2 %
LACTIC ACID, SEPSIS: 1.6 MMOL/L (ref 0.5–1.9)
LYMPHOCYTES ABSOLUTE: 3.9 THOU/MM3 (ref 1–4.8)
LYMPHOCYTES NFR BLD AUTO: 31.8 %
MCH RBC QN AUTO: 22.3 PG (ref 26–33)
MCHC RBC AUTO-ENTMCNC: 31.4 GM/DL (ref 32.2–35.5)
MCV RBC AUTO: 71 FL (ref 81–99)
MONOCYTES ABSOLUTE: 0.9 THOU/MM3 (ref 0.4–1.3)
MONOCYTES NFR BLD AUTO: 7 %
NEUTROPHILS ABSOLUTE: 7.3 THOU/MM3 (ref 1.8–7.7)
NEUTROPHILS NFR BLD AUTO: 58.8 %
NRBC BLD AUTO-RTO: 0 /100 WBC
OSMOLALITY SERPL CALC.SUM OF ELEC: 271.5 MOSMOL/KG (ref 275–300)
PLATELET # BLD AUTO: 393 THOU/MM3 (ref 130–400)
PMV BLD AUTO: 8.9 FL (ref 9.4–12.4)
POTASSIUM SERPL-SCNC: 3.3 MEQ/L (ref 3.5–5.2)
PROT SERPL-MCNC: 7.8 G/DL (ref 6.1–8)
RBC # BLD AUTO: 5.07 MILL/MM3 (ref 4.2–5.4)
SODIUM SERPL-SCNC: 136 MEQ/L (ref 135–145)
WBC # BLD AUTO: 12.4 THOU/MM3 (ref 4.8–10.8)

## 2024-06-23 PROCEDURE — 83605 ASSAY OF LACTIC ACID: CPT

## 2024-06-23 PROCEDURE — 99285 EMERGENCY DEPT VISIT HI MDM: CPT

## 2024-06-23 PROCEDURE — 84703 CHORIONIC GONADOTROPIN ASSAY: CPT

## 2024-06-23 PROCEDURE — 80053 COMPREHEN METABOLIC PANEL: CPT

## 2024-06-23 PROCEDURE — 36415 COLL VENOUS BLD VENIPUNCTURE: CPT

## 2024-06-23 PROCEDURE — 71260 CT THORAX DX C+: CPT

## 2024-06-23 PROCEDURE — 6360000004 HC RX CONTRAST MEDICATION

## 2024-06-23 PROCEDURE — 85025 COMPLETE CBC W/AUTO DIFF WBC: CPT

## 2024-06-23 PROCEDURE — 87040 BLOOD CULTURE FOR BACTERIA: CPT

## 2024-06-23 RX ADMIN — IOPAMIDOL 80 ML: 755 INJECTION, SOLUTION INTRAVENOUS at 22:26

## 2024-06-23 NOTE — ED TRIAGE NOTES
Pt presents to the ED through [facility] with c/o abscess. States she noticed an abscess under her right breast around 3 weeks ago. States one week ago she noticed that abscess appears larger. States she is also having pain in her right breast

## 2024-06-24 RX ORDER — ACETAMINOPHEN 500 MG
500 TABLET ORAL 4 TIMES DAILY PRN
Qty: 120 TABLET | Refills: 0 | Status: SHIPPED | OUTPATIENT
Start: 2024-06-24

## 2024-06-24 RX ORDER — CLINDAMYCIN HYDROCHLORIDE 150 MG/1
450 CAPSULE ORAL 3 TIMES DAILY
Qty: 90 CAPSULE | Refills: 0 | Status: SHIPPED | OUTPATIENT
Start: 2024-06-24 | End: 2024-07-04

## 2024-06-24 NOTE — DISCHARGE INSTRUCTIONS
Please call the general surgeon information provided.     Take your medication as indicated, if you are given an antibiotic then make sure you get the prescription filled and take the antibiotics until finished.  Drink plenty of water while taking the antibiotics.  Avoid drinking alcohol or drinks that have caffeine in it while taking antibiotics.       For pain use ibuprofen (Motrin / Advil) or acetaminophen (Tylenol), unless prescribed medications that have acetaminophen in it.  You can take over the counter acetaminophen tablets (1 - 2 tablets of the 500-mg strength every 6 hours) or ibuprofen tablets (2 tablets every 4 hours).    PLEASE RETURN TO THE EMERGENCY DEPARTMENT IMMEDIATELY for worsening symptoms, white drainage from the wound, redness or streaking, or if you develop any concerning symptoms such as: high fever not relieved by acetaminophen (Tylenol) and/or ibuprofen (Motrin / Advil), chills, shortness of breath, chest pain, feeling of your heart fluttering or racing, persistent nausea and/or vomiting, numbness, weakness or tingling in the arms or legs or change in color of the extremities, changes in mental status, persistent headache, blurry vision, unable to follow up with your physician, or other any other care or concern.

## 2024-06-24 NOTE — ED PROVIDER NOTES
examined. Appropriate diagnostic testing was performed and results reviewed with the patient.      The results of pertinent diagnostic studies and exam findings were discussed. The patient’s provisional diagnosis and plan of care were discussed with the patient and present family who expressed understanding. Any medications were reviewed and indications and risks of medications were discussed with the patient /family present. Strict verbal and written return precautions, instructions and appropriate follow-up provided to  the patient.     ED Medications administered this visit:  (None if blank)  Medications   iopamidol (ISOVUE-370) 76 % injection 80 mL (80 mLs IntraVENous Given 6/23/24 6360)         PROCEDURES: (None if blank)      CRITICAL CARE: (None if blank)      DISCHARGE PRESCRIPTIONS: (None if blank)  Discharge Medication List as of 6/24/2024 12:10 AM        START taking these medications    Details   clindamycin (CLEOCIN) 150 MG capsule Take 3 capsules by mouth 3 times daily for 10 days, Disp-90 capsule, R-0Normal      acetaminophen (TYLENOL) 500 MG tablet Take 1 tablet by mouth 4 times daily as needed for Pain, Disp-120 tablet, R-0Normal             FINAL IMPRESSION      1. Breast abscess          DISPOSITION/PLAN   DISPOSITION Decision To Discharge 06/24/2024 12:10:00 AM      OUTPATIENT FOLLOW UP THE PATIENT:  [facility]  [address] [facility] [address]  [phone]  Call today  Please call first thing in the morning to schedule an appointment for breast abscess.    Leonarda Viveros, CAMRYN - [facility]  [address] [facility] [id number] [phone]    Schedule an appointment as soon as possible for a visit in 2 days        CHETAN West Rachel A, PA-C  06/24/24 7543

## 2024-06-25 PROBLEM — K29.00 ACUTE GASTRITIS: Status: ACTIVE | Noted: 2021-03-26

## 2024-06-25 PROBLEM — F90.9 ATTENTION DEFICIT HYPERACTIVITY DISORDER (ADHD): Status: ACTIVE | Noted: 2021-12-30

## 2024-06-25 PROBLEM — E55.9 VITAMIN D DEFICIENCY: Status: ACTIVE | Noted: 2022-11-14

## 2024-06-25 PROBLEM — L73.2 HIDRADENITIS SUPPURATIVA: Status: ACTIVE | Noted: 2018-02-23

## 2024-06-25 PROBLEM — G25.0 HEREDITARY ESSENTIAL TREMOR: Status: ACTIVE | Noted: 2021-09-15

## 2024-06-25 PROBLEM — J30.9 ALLERGIC RHINITIS: Status: ACTIVE | Noted: 2023-11-10

## 2024-06-25 PROBLEM — E66.9 SIMPLE OBESITY: Status: ACTIVE | Noted: 2020-04-23

## 2024-06-25 PROBLEM — F31.9 BIPOLAR I DISORDER (HCC): Status: ACTIVE | Noted: 2018-01-01

## 2024-06-27 ENCOUNTER — OFFICE VISIT (OUTPATIENT)
Dept: SURGERY | Age: 34
End: 2024-06-27
Payer: COMMERCIAL

## 2024-06-27 ENCOUNTER — TELEPHONE (OUTPATIENT)
Dept: SURGERY | Age: 34
End: 2024-06-27

## 2024-06-27 VITALS
OXYGEN SATURATION: 98 % | WEIGHT: 232.7 LBS | TEMPERATURE: 98 F | RESPIRATION RATE: 16 BRPM | BODY MASS INDEX: 36.45 KG/M2 | HEART RATE: 97 BPM | DIASTOLIC BLOOD PRESSURE: 64 MMHG | SYSTOLIC BLOOD PRESSURE: 132 MMHG

## 2024-06-27 DIAGNOSIS — N61.1 BREAST ABSCESS: Primary | ICD-10-CM

## 2024-06-27 PROCEDURE — 10061 I&D ABSCESS COMP/MULTIPLE: CPT | Performed by: SURGERY

## 2024-06-27 RX ORDER — RIFAMPIN 300 MG/1
300 CAPSULE ORAL 2 TIMES DAILY
COMMUNITY

## 2024-06-27 RX ORDER — OXYCODONE HYDROCHLORIDE AND ACETAMINOPHEN 5; 325 MG/1; MG/1
1 TABLET ORAL EVERY 6 HOURS PRN
Qty: 12 TABLET | Refills: 0 | Status: SHIPPED | OUTPATIENT
Start: 2024-06-27 | End: 2024-06-30

## 2024-06-27 RX ORDER — INFLIXIMAB 100 MG/10ML
5 INJECTION, POWDER, LYOPHILIZED, FOR SOLUTION INTRAVENOUS SEE ADMIN INSTRUCTIONS
COMMUNITY

## 2024-06-27 ASSESSMENT — ENCOUNTER SYMPTOMS: GASTROINTESTINAL NEGATIVE: 1

## 2024-06-27 NOTE — PROGRESS NOTES
Subjective   Patient ID: Kristi Valdes is a 33 y.o. female.    Pleasant 33-year-old female who struggles with hidradenitis, she is followed at [facility].  She was recently started on Remicade.  She did see some improvement in her axillary lesions however developed a lesion recently under her breast.  It started out as cellulitis but has now progressed into a large abscess.  She comes in today for evaluation.      Chief Complaint   Patient presents with    Surgical Consult     NP refer [facility]-Right breast abscess-On Clindamycin        Review of Systems   Constitutional:  Positive for chills and fatigue.   Cardiovascular: Negative.    Gastrointestinal: Negative.    Skin:  Positive for wound.          Objective   Physical Exam  Constitutional:       Appearance: Normal appearance.   Cardiovascular:      Rate and Rhythm: Normal rate.   Skin:            Comments: Active bilateral axillary hidradenitis that is seeping.  Right breast there is extensive cellulitis and   Neurological:      Mental Status: She is alert.   Psychiatric:         Mood and Affect: Mood normal.         Behavior: Behavior normal.        /64 (Site: Left Upper Arm, Position: Sitting, Cuff Size: Medium Adult)   Pulse 97   Temp 98 °F (36.7 °C)   Resp 16   Wt 105.6 kg (232 lb 11.2 oz)   LMP 06/21/2024   SpO2 98%   BMI 36.45 kg/m²       Assessment   33-year-old female with active hidradenitis being treated who developed a new breast hidradenitis with large abscess needing drained      Plan   Plan for bedside incision and drainage with drain placement.    PROCEDURE NOTE  INCISION AND DRAINAGE        PATIENT NAME: Kristi Valdes  MEDICAL RECORD NO. [id number]  DATE: 06/27/2024      SURGEON: Jennifer Dick MD     PREOPERATIVE DIAGNOSIS: Right breast abscess  LOCATION: Right inframammary fold in the right breast  SIZE: 5 x 2 x 3 cm    POSTOPERATIVE DIAGNOSIS: same    PROCEDURE PERFORMED: Incision and drainage of right breast

## 2024-06-27 NOTE — TELEPHONE ENCOUNTER
Pt called stating [facility] does not carry Percocet.  Could you send it to [facility].  Pharm loaded.

## 2024-06-28 LAB — BACTERIA BLD AEROBE CULT: NORMAL

## 2024-06-28 RX ORDER — OXYCODONE HYDROCHLORIDE AND ACETAMINOPHEN 5; 325 MG/1; MG/1
1 TABLET ORAL EVERY 6 HOURS PRN
Qty: 12 TABLET | Refills: 0 | Status: SHIPPED | OUTPATIENT
Start: 2024-06-28 | End: 2024-07-01

## 2024-06-29 LAB — BACTERIA BLD AEROBE CULT: NORMAL

## 2024-06-30 LAB
BACTERIA SPEC AEROBE CULT: ABNORMAL
BACTERIA SPEC AEROBE CULT: ABNORMAL
GRAM STN SPEC: ABNORMAL
ORGANISM: ABNORMAL
ORGANISM: ABNORMAL

## 2024-07-02 ENCOUNTER — OFFICE VISIT (OUTPATIENT)
Dept: SURGERY | Age: 34
End: 2024-07-02

## 2024-07-02 VITALS — TEMPERATURE: 97.3 F | HEART RATE: 63 BPM | OXYGEN SATURATION: 98 % | RESPIRATION RATE: 18 BRPM

## 2024-07-02 DIAGNOSIS — G89.18 POST-OP PAIN: Primary | ICD-10-CM

## 2024-07-02 PROCEDURE — 99024 POSTOP FOLLOW-UP VISIT: CPT | Performed by: SURGERY

## 2024-07-02 RX ORDER — SULFAMETHOXAZOLE AND TRIMETHOPRIM 800; 160 MG/1; MG/1
1 TABLET ORAL 2 TIMES DAILY
Qty: 14 TABLET | Refills: 0 | Status: SHIPPED | OUTPATIENT
Start: 2024-07-02 | End: 2024-07-09

## 2024-07-02 RX ORDER — OXYCODONE HYDROCHLORIDE AND ACETAMINOPHEN 5; 325 MG/1; MG/1
1 TABLET ORAL EVERY 6 HOURS PRN
Qty: 20 TABLET | Refills: 0 | Status: SHIPPED | OUTPATIENT
Start: 2024-07-02 | End: 2024-07-07

## 2024-07-09 ENCOUNTER — OFFICE VISIT (OUTPATIENT)
Dept: SURGERY | Age: 34
End: 2024-07-09

## 2024-07-09 VITALS
HEIGHT: 67 IN | OXYGEN SATURATION: 98 % | BODY MASS INDEX: 36.44 KG/M2 | TEMPERATURE: 97.8 F | DIASTOLIC BLOOD PRESSURE: 72 MMHG | HEART RATE: 101 BPM | SYSTOLIC BLOOD PRESSURE: 115 MMHG

## 2024-07-09 DIAGNOSIS — N61.1 BREAST ABSCESS: Primary | ICD-10-CM

## 2024-07-09 PROBLEM — K29.00 ACUTE GASTRITIS: Status: RESOLVED | Noted: 2021-03-26 | Resolved: 2024-07-09

## 2024-07-09 PROBLEM — J30.9 ALLERGIC RHINITIS: Status: RESOLVED | Noted: 2023-11-10 | Resolved: 2024-07-09

## 2024-07-09 PROCEDURE — 99024 POSTOP FOLLOW-UP VISIT: CPT | Performed by: SURGERY

## 2024-07-09 NOTE — PROGRESS NOTES
regardless of in vitro sensitivity, should not be used for staphylococcal infections other than uncomplicated lower UTIs. Moxifloxacin, regardless of in vitro sensitivity, should not be used for staphylococcal infections.   Organism Stenotrophomonas maltophilia Abnormal    Aerobic Culture very light growth This organism is frequently present as a colonizer (eg. resp. tract); virulence is low, except in immunosuppressed pts. There is an increased incidence of colonization with the use of broad spectrum antibiotics, especially imipenem. Clinical significance of susceptibility testing is unknown, therefore empiric therapy is recommended. If isolate is suspected of being a pathogen (isolated from normally sterile site or in immunocompromised host), then Microbiology consultation is suggested.   Resulting Agency [facility]        Susceptibility    Staphylococcus aureus (1)    Antibiotic Interpretation Microscan  Method Status    gentamicin Sensitive <=0.5 mcg/mL BACTERIAL SUSCEPTIBILITY PANEL BY JOHN Final    clindamycin Sensitive <=0.25 mcg/mL BACTERIAL SUSCEPTIBILITY PANEL BY JOHN Final    oxacillin Sensitive <=0.25 mcg/mL BACTERIAL SUSCEPTIBILITY PANEL BY JOHN Final    tetracycline Sensitive <=1 mcg/mL BACTERIAL SUSCEPTIBILITY PANEL BY JOHN Final    trimethoprim-sulfamethoxazole Sensitive <=10 mcg/mL BACTERIAL SUSCEPTIBILITY PANEL BY JOHN Final    Stenotrophomonas maltophilia (2)    Antibiotic Interpretation Microscan  Method Status    levofloxacin Sensitive 0.5 mcg/mL BACTERIAL SUSCEPTIBILITY PANEL BY JOHN Final    trimethoprim-sulfamethoxazole Sensitive <=20 mcg/mL BACTERIAL SUSCEPTIBILITY PANEL BY JOHN                  RADIOLOGY        Electronically signed by Jennifer Dick MD on 7/9/2024 at 1:54 PM

## 2024-07-09 NOTE — PROGRESS NOTES
drainage.  The tract is well-developed.  The drain was removed without sequelae   Neurological:      Mental Status: She is alert.          LABS and Pathology   na    RADIOLOGY   na    Electronically signed by Jennifer Dick MD on 7/9/2024 at 10:10 AM

## 2025-02-25 ENCOUNTER — APPOINTMENT (OUTPATIENT)
Dept: CT IMAGING | Age: 35
End: 2025-02-25
Payer: COMMERCIAL

## 2025-02-25 ENCOUNTER — APPOINTMENT (OUTPATIENT)
Dept: GENERAL RADIOLOGY | Age: 35
End: 2025-02-25
Payer: COMMERCIAL

## 2025-02-25 ENCOUNTER — HOSPITAL ENCOUNTER (EMERGENCY)
Age: 35
Discharge: LEFT BEFORE DISCHARGE | End: 2025-02-25
Payer: COMMERCIAL

## 2025-02-25 VITALS
DIASTOLIC BLOOD PRESSURE: 74 MMHG | SYSTOLIC BLOOD PRESSURE: 120 MMHG | RESPIRATION RATE: 20 BRPM | HEART RATE: 52 BPM | OXYGEN SATURATION: 100 % | TEMPERATURE: 98 F

## 2025-02-25 DIAGNOSIS — R07.9 CHEST PAIN, UNSPECIFIED TYPE: Primary | ICD-10-CM

## 2025-02-25 LAB
ANION GAP SERPL CALC-SCNC: 17 MEQ/L (ref 8–16)
B-HCG SERPL QL: NEGATIVE
BASOPHILS ABSOLUTE: 0 THOU/MM3 (ref 0–0.1)
BASOPHILS NFR BLD AUTO: 0.3 %
BUN SERPL-MCNC: 9 MG/DL (ref 7–22)
CALCIUM SERPL-MCNC: 8.6 MG/DL (ref 8.2–9.6)
CHLORIDE SERPL-SCNC: 103 MEQ/L (ref 98–111)
CO2 SERPL-SCNC: 21 MEQ/L (ref 23–33)
CREAT SERPL-MCNC: 0.6 MG/DL (ref 0.4–1.2)
D DIMER PPP IA.FEU-MCNC: 920 NG/ML FEU (ref 0–500)
DEPRECATED RDW RBC AUTO: 40.4 FL (ref 35–45)
EKG ATRIAL RATE: 69 BPM
EKG P AXIS: 24 DEGREES
EKG P-R INTERVAL: 180 MS
EKG Q-T INTERVAL: 380 MS
EKG QRS DURATION: 88 MS
EKG QTC CALCULATION (BAZETT): 407 MS
EKG R AXIS: 16 DEGREES
EKG T AXIS: -6 DEGREES
EKG VENTRICULAR RATE: 69 BPM
EOSINOPHIL NFR BLD AUTO: 2.1 %
EOSINOPHILS ABSOLUTE: 0.2 THOU/MM3 (ref 0–0.4)
ERYTHROCYTE [DISTWIDTH] IN BLOOD BY AUTOMATED COUNT: 16.2 % (ref 11.5–14.5)
GFR SERPL CREATININE-BSD FRML MDRD: > 90 ML/MIN/1.73M2
GLUCOSE SERPL-MCNC: 90 MG/DL (ref 70–108)
HCT VFR BLD AUTO: 35.5 % (ref 37–47)
HGB BLD-MCNC: 11 GM/DL (ref 12–16)
HYPOCHROMIA BLD QL SMEAR: PRESENT
IMM GRANULOCYTES # BLD AUTO: 0.02 THOU/MM3 (ref 0–0.07)
IMM GRANULOCYTES NFR BLD AUTO: 0.2 %
LYMPHOCYTES ABSOLUTE: 3.3 THOU/MM3 (ref 1–4.8)
LYMPHOCYTES NFR BLD AUTO: 38.7 %
MAGNESIUM SERPL-MCNC: 2 MG/DL (ref 1.6–2.4)
MCH RBC QN AUTO: 21.6 PG (ref 26–33)
MCHC RBC AUTO-ENTMCNC: 31 GM/DL (ref 32.2–35.5)
MCV RBC AUTO: 69.7 FL (ref 81–99)
MICROCYTES BLD QL SMEAR: PRESENT
MONOCYTES ABSOLUTE: 0.4 THOU/MM3 (ref 0.4–1.3)
MONOCYTES NFR BLD AUTO: 4.8 %
NEUTROPHILS ABSOLUTE: 4.6 THOU/MM3 (ref 1.8–7.7)
NEUTROPHILS NFR BLD AUTO: 53.9 %
NRBC BLD AUTO-RTO: 0 /100 WBC
NT-PROBNP SERPL IA-MCNC: 36.1 PG/ML (ref 0–124)
OSMOLALITY SERPL CALC.SUM OF ELEC: 279.5 MOSMOL/KG (ref 275–300)
PATHOLOGIST REVIEW: ABNORMAL
PLATELET # BLD AUTO: 335 THOU/MM3 (ref 130–400)
PLATELET BLD QL SMEAR: ADEQUATE
PMV BLD AUTO: 8.7 FL (ref 9.4–12.4)
POIKILOCYTES: ABNORMAL
POTASSIUM SERPL-SCNC: 3.9 MEQ/L (ref 3.5–5.2)
RBC # BLD AUTO: 5.09 MILL/MM3 (ref 4.2–5.4)
SCAN OF BLOOD SMEAR: NORMAL
SODIUM SERPL-SCNC: 141 MEQ/L (ref 135–145)
TROPONIN, HIGH SENSITIVITY: < 6 NG/L (ref 0–12)
WBC # BLD AUTO: 8.6 THOU/MM3 (ref 4.8–10.8)

## 2025-02-25 PROCEDURE — 93005 ELECTROCARDIOGRAM TRACING: CPT | Performed by: PHYSICIAN ASSISTANT

## 2025-02-25 PROCEDURE — 84484 ASSAY OF TROPONIN QUANT: CPT

## 2025-02-25 PROCEDURE — 71046 X-RAY EXAM CHEST 2 VIEWS: CPT

## 2025-02-25 PROCEDURE — 36415 COLL VENOUS BLD VENIPUNCTURE: CPT

## 2025-02-25 PROCEDURE — 80048 BASIC METABOLIC PNL TOTAL CA: CPT

## 2025-02-25 PROCEDURE — 85025 COMPLETE CBC W/AUTO DIFF WBC: CPT

## 2025-02-25 PROCEDURE — 85379 FIBRIN DEGRADATION QUANT: CPT

## 2025-02-25 PROCEDURE — 83880 ASSAY OF NATRIURETIC PEPTIDE: CPT

## 2025-02-25 PROCEDURE — 96374 THER/PROPH/DIAG INJ IV PUSH: CPT

## 2025-02-25 PROCEDURE — 6360000002 HC RX W HCPCS: Performed by: PHYSICIAN ASSISTANT

## 2025-02-25 PROCEDURE — 93010 ELECTROCARDIOGRAM REPORT: CPT | Performed by: NUCLEAR MEDICINE

## 2025-02-25 PROCEDURE — 84703 CHORIONIC GONADOTROPIN ASSAY: CPT

## 2025-02-25 PROCEDURE — 99285 EMERGENCY DEPT VISIT HI MDM: CPT

## 2025-02-25 PROCEDURE — 83735 ASSAY OF MAGNESIUM: CPT

## 2025-02-25 RX ORDER — KETOROLAC TROMETHAMINE 30 MG/ML
30 INJECTION, SOLUTION INTRAMUSCULAR; INTRAVENOUS ONCE
Status: COMPLETED | OUTPATIENT
Start: 2025-02-25 | End: 2025-02-25

## 2025-02-25 RX ADMIN — KETOROLAC TROMETHAMINE 30 MG: 30 INJECTION, SOLUTION INTRAMUSCULAR at 09:30

## 2025-02-25 ASSESSMENT — PAIN DESCRIPTION - LOCATION: LOCATION: CHEST

## 2025-02-25 ASSESSMENT — PAIN SCALES - GENERAL: PAINLEVEL_OUTOF10: 4

## 2025-02-25 ASSESSMENT — PAIN DESCRIPTION - ORIENTATION: ORIENTATION: RIGHT;LEFT

## 2025-02-25 ASSESSMENT — PAIN DESCRIPTION - DESCRIPTORS: DESCRIPTORS: SQUEEZING;TIGHTNESS

## 2025-02-25 NOTE — ED NOTES
This RN to the bedside for rounding. Patient refused COVID/Flu swab at this time. Patient VSS. Respirations are easy and unlabored. Patient appears to be in no current distress at this time. Call light within reach.

## 2025-02-25 NOTE — ED PROVIDER NOTES
[facility] EMERGENCY DEPARTMENT      EMERGENCY MEDICINE     Pt Name: Kristi Valdes  MRN: [id number]  Birthdate 1990  Date of evaluation: 2/25/2025  Provider: FÉLIX Michaels    CHIEF COMPLAINT       Chief Complaint   Patient presents with    Chest Pain     HISTORY OF PRESENT ILLNESS   Kristi Valdes is a pleasant 34 y.o. female who presents to the emergency department from from home, by private vehicle for evaluation of right-sided chest pain that is been going on off and on since Friday.  Sputum with movement.  She also notes with deep breathing.  She been coughing for about a week.  She had no fever.  Denies any vomiting diarrhea.  She is otherwise no complaints..        PASTMEDICAL HISTORY     Past Medical History:   Diagnosis Date    Allergic rhinitis 11/10/2023    Anxiety 02/2014    Bipolar 1 disorder (HCC)     Bipolar I disorder (HCC) 1/1/2018    Diabetes mellitus (HCC)     Headache(784.0)     as an child    Hidradenitis suppurativa     PCOS (polycystic ovarian syndrome)     Simple obesity 4/23/2020       Patient Active Problem List   Diagnosis Code    Vitamin D deficiency E55.9    Simple obesity E66.9    Hidradenitis suppurativa L73.2    Hereditary essential tremor G25.0    Bipolar I disorder (HCC) F31.9    Attention deficit hyperactivity disorder (ADHD) F90.9     SURGICAL HISTORY     History reviewed. No pertinent surgical history.    CURRENT MEDICATIONS       Discharge Medication List as of 2/25/2025 10:44 AM        CONTINUE these medications which have NOT CHANGED    Details   rifAMPin (RIFADIN) 300 MG capsule Take 1 capsule by mouth 2 times dailyHistorical Med      inFLIXimab (REMICADE) 100 MG injection Infuse 5 mg/kg intravenously See Admin InstructionsHistorical Med      acetaminophen (TYLENOL) 500 MG tablet Take 1 tablet by mouth 4 times daily as needed for Pain, Disp-120 tablet, R-0Normal      ondansetron (ZOFRAN ODT) 4 MG disintegrating tablet Take 1 tablet by mouth

## 2025-02-25 NOTE — ED NOTES
Pt to the ED via self with family. Patient presents with complaints of chest pain that started on Friday night. Patient states that she was at rest when it occurred and expresses worsening symptoms when moving. Patient states it is more of a tightness than pain. EKG done. Patient is alert and oriented x 4. Respirations are regular and unlabored. Patient provided blanket. Family at the bedside and call light within reach.

## 2025-02-25 NOTE — ED NOTES
Patient unable to be found in room. Patient contacted and provided HIPAA friendly conversation to return due to an elevated lab result. Patient expresses she will not be returning and still has her IV present, patient advised to return for removal of IV. Patient states that she works at a [employer] and they can remove it. Charge nurse and provider notified.

## 2025-06-16 ENCOUNTER — APPOINTMENT (OUTPATIENT)
Dept: CT IMAGING | Age: 35
End: 2025-06-16
Payer: COMMERCIAL

## 2025-06-16 ENCOUNTER — HOSPITAL ENCOUNTER (EMERGENCY)
Age: 35
Discharge: HOME | End: 2025-06-16
Attending: EMERGENCY MEDICINE
Payer: COMMERCIAL

## 2025-06-16 ENCOUNTER — APPOINTMENT (OUTPATIENT)
Dept: GENERAL RADIOLOGY | Age: 35
End: 2025-06-16
Payer: COMMERCIAL

## 2025-06-16 VITALS
DIASTOLIC BLOOD PRESSURE: 85 MMHG | RESPIRATION RATE: 20 BRPM | TEMPERATURE: 98.4 F | SYSTOLIC BLOOD PRESSURE: 128 MMHG | OXYGEN SATURATION: 100 % | HEART RATE: 87 BPM

## 2025-06-16 DIAGNOSIS — K44.9 HIATAL HERNIA: ICD-10-CM

## 2025-06-16 DIAGNOSIS — J02.9 SORE THROAT: Primary | ICD-10-CM

## 2025-06-16 LAB
ALBUMIN SERPL BCG-MCNC: 3.7 G/DL (ref 3.4–4.9)
ALP SERPL-CCNC: 92 U/L (ref 38–126)
ALT SERPL W/O P-5'-P-CCNC: 34 U/L (ref 10–35)
ANION GAP SERPL CALC-SCNC: 12 MEQ/L (ref 8–16)
ANISOCYTOSIS BLD QL SMEAR: PRESENT
AST SERPL-CCNC: 33 U/L (ref 10–35)
B-HCG SERPL QL: NEGATIVE
BASOPHILS ABSOLUTE: 0 THOU/MM3 (ref 0–0.1)
BASOPHILS NFR BLD AUTO: 0.3 %
BILIRUB SERPL-MCNC: 0.5 MG/DL (ref 0.3–1.2)
BUN SERPL-MCNC: 8 MG/DL (ref 8–23)
CALCIUM SERPL-MCNC: 9.3 MG/DL (ref 8.6–10)
CHLORIDE SERPL-SCNC: 107 MEQ/L (ref 98–111)
CO2 SERPL-SCNC: 21 MEQ/L (ref 22–29)
CREAT SERPL-MCNC: 0.7 MG/DL (ref 0.5–0.9)
DEPRECATED RDW RBC AUTO: 37.4 FL (ref 35–45)
EKG ATRIAL RATE: 85 BPM
EKG P AXIS: 24 DEGREES
EKG P-R INTERVAL: 164 MS
EKG Q-T INTERVAL: 342 MS
EKG QRS DURATION: 86 MS
EKG QTC CALCULATION (BAZETT): 406 MS
EKG R AXIS: 21 DEGREES
EKG T AXIS: -9 DEGREES
EKG VENTRICULAR RATE: 85 BPM
EOSINOPHIL NFR BLD AUTO: 4.2 %
EOSINOPHILS ABSOLUTE: 0.5 THOU/MM3 (ref 0–0.4)
ERYTHROCYTE [DISTWIDTH] IN BLOOD BY AUTOMATED COUNT: 15.2 % (ref 11.5–14.5)
GFR SERPL CREATININE-BSD FRML MDRD: > 90 ML/MIN/1.73M2
GLUCOSE SERPL-MCNC: 86 MG/DL (ref 74–109)
HCT VFR BLD AUTO: 37.4 % (ref 37–47)
HGB BLD-MCNC: 11.6 GM/DL (ref 12–16)
HYPOCHROMIA BLD QL SMEAR: PRESENT
IMM GRANULOCYTES # BLD AUTO: 0.04 THOU/MM3 (ref 0–0.07)
IMM GRANULOCYTES NFR BLD AUTO: 0.3 %
LYMPHOCYTES ABSOLUTE: 2.1 THOU/MM3 (ref 1–4.8)
LYMPHOCYTES NFR BLD AUTO: 18.3 %
MCH RBC QN AUTO: 21.6 PG (ref 26–33)
MCHC RBC AUTO-ENTMCNC: 31 GM/DL (ref 32.2–35.5)
MCV RBC AUTO: 69.8 FL (ref 81–99)
MICROCYTES BLD QL SMEAR: PRESENT
MONOCYTES ABSOLUTE: 0.6 THOU/MM3 (ref 0.4–1.3)
MONOCYTES NFR BLD AUTO: 5.5 %
NEUTROPHILS ABSOLUTE: 8.4 THOU/MM3 (ref 1.8–7.7)
NEUTROPHILS NFR BLD AUTO: 71.4 %
NRBC BLD AUTO-RTO: 0 /100 WBC
NT-PROBNP SERPL IA-MCNC: < 36 PG/ML (ref 0–124)
OSMOLALITY SERPL CALC.SUM OF ELEC: 277 MOSMOL/KG (ref 275–300)
PLATELET # BLD AUTO: 331 THOU/MM3 (ref 130–400)
PLATELET BLD QL SMEAR: ADEQUATE
PMV BLD AUTO: 8.7 FL (ref 9.4–12.4)
POTASSIUM SERPL-SCNC: 3.9 MEQ/L (ref 3.5–5.2)
PROT SERPL-MCNC: 8 G/DL (ref 6.4–8.3)
RBC # BLD AUTO: 5.36 MILL/MM3 (ref 4.2–5.4)
SCAN OF BLOOD SMEAR: NORMAL
SODIUM SERPL-SCNC: 140 MEQ/L (ref 135–145)
TROPONIN, HIGH SENSITIVITY: < 6 NG/L (ref 0–12)
WBC # BLD AUTO: 11.7 THOU/MM3 (ref 4.8–10.8)

## 2025-06-16 PROCEDURE — 99285 EMERGENCY DEPT VISIT HI MDM: CPT

## 2025-06-16 PROCEDURE — 96374 THER/PROPH/DIAG INJ IV PUSH: CPT

## 2025-06-16 PROCEDURE — 70491 CT SOFT TISSUE NECK W/DYE: CPT

## 2025-06-16 PROCEDURE — 83880 ASSAY OF NATRIURETIC PEPTIDE: CPT

## 2025-06-16 PROCEDURE — 96375 TX/PRO/DX INJ NEW DRUG ADDON: CPT

## 2025-06-16 PROCEDURE — 93010 ELECTROCARDIOGRAM REPORT: CPT | Performed by: INTERNAL MEDICINE

## 2025-06-16 PROCEDURE — 71045 X-RAY EXAM CHEST 1 VIEW: CPT

## 2025-06-16 PROCEDURE — 2580000003 HC RX 258: Performed by: EMERGENCY MEDICINE

## 2025-06-16 PROCEDURE — 93005 ELECTROCARDIOGRAM TRACING: CPT | Performed by: EMERGENCY MEDICINE

## 2025-06-16 PROCEDURE — 36415 COLL VENOUS BLD VENIPUNCTURE: CPT

## 2025-06-16 PROCEDURE — 85025 COMPLETE CBC W/AUTO DIFF WBC: CPT

## 2025-06-16 PROCEDURE — 80053 COMPREHEN METABOLIC PANEL: CPT

## 2025-06-16 PROCEDURE — 6370000000 HC RX 637 (ALT 250 FOR IP)

## 2025-06-16 PROCEDURE — 84703 CHORIONIC GONADOTROPIN ASSAY: CPT

## 2025-06-16 PROCEDURE — 84484 ASSAY OF TROPONIN QUANT: CPT

## 2025-06-16 PROCEDURE — 6360000002 HC RX W HCPCS

## 2025-06-16 PROCEDURE — 71260 CT THORAX DX C+: CPT

## 2025-06-16 PROCEDURE — 6360000004 HC RX CONTRAST MEDICATION

## 2025-06-16 RX ORDER — SUCRALFATE ORAL 1 G/10ML
1 SUSPENSION ORAL 4 TIMES DAILY
Qty: 1200 ML | Refills: 3 | Status: SHIPPED | OUTPATIENT
Start: 2025-06-16

## 2025-06-16 RX ORDER — 0.9 % SODIUM CHLORIDE 0.9 %
1000 INTRAVENOUS SOLUTION INTRAVENOUS ONCE
Status: COMPLETED | OUTPATIENT
Start: 2025-06-16 | End: 2025-06-16

## 2025-06-16 RX ORDER — PANTOPRAZOLE SODIUM 40 MG/1
40 TABLET, DELAYED RELEASE ORAL
Qty: 30 TABLET | Refills: 0 | Status: SHIPPED | OUTPATIENT
Start: 2025-06-16

## 2025-06-16 RX ORDER — IOPAMIDOL 755 MG/ML
80 INJECTION, SOLUTION INTRAVASCULAR
Status: COMPLETED | OUTPATIENT
Start: 2025-06-16 | End: 2025-06-16

## 2025-06-16 RX ORDER — MORPHINE SULFATE 4 MG/ML
4 INJECTION, SOLUTION INTRAMUSCULAR; INTRAVENOUS ONCE
Refills: 0 | Status: COMPLETED | OUTPATIENT
Start: 2025-06-16 | End: 2025-06-16

## 2025-06-16 RX ORDER — ONDANSETRON 2 MG/ML
4 INJECTION INTRAMUSCULAR; INTRAVENOUS ONCE
Status: COMPLETED | OUTPATIENT
Start: 2025-06-16 | End: 2025-06-16

## 2025-06-16 RX ADMIN — SODIUM CHLORIDE 1000 ML: 0.9 INJECTION, SOLUTION INTRAVENOUS at 13:12

## 2025-06-16 RX ADMIN — ONDANSETRON 4 MG: 2 INJECTION, SOLUTION INTRAMUSCULAR; INTRAVENOUS at 13:12

## 2025-06-16 RX ADMIN — IOPAMIDOL 80 ML: 755 INJECTION, SOLUTION INTRAVENOUS at 14:03

## 2025-06-16 RX ADMIN — LIDOCAINE HYDROCHLORIDE: 20 SOLUTION ORAL at 14:43

## 2025-06-16 RX ADMIN — MORPHINE SULFATE 4 MG: 4 INJECTION, SOLUTION INTRAMUSCULAR; INTRAVENOUS at 13:12

## 2025-06-16 ASSESSMENT — PAIN DESCRIPTION - LOCATION: LOCATION: CHEST

## 2025-06-16 ASSESSMENT — PAIN DESCRIPTION - DESCRIPTORS: DESCRIPTORS: ACHING

## 2025-06-16 ASSESSMENT — PAIN SCALES - GENERAL: PAINLEVEL_OUTOF10: 3

## 2025-06-16 ASSESSMENT — PAIN - FUNCTIONAL ASSESSMENT: PAIN_FUNCTIONAL_ASSESSMENT: 0-10

## 2025-06-16 NOTE — ED PROVIDER NOTES
[facility] EMERGENCY DEPARTMENT  EMERGENCY DEPARTMENT ENCOUNTER          Pt Name: Kristi Valdes  MRN: [id number]  Birthdate 1990  Date of evaluation: 6/16/2025  Resident Physician: Navin Jimenez MD EM Resident PGY-3  Attending Physician: Marj Michael MD      CHIEF COMPLAINT       Chief Complaint   Patient presents with    Chest Pain         HISTORY OF PRESENT ILLNESS    HPI  Kristi Valdes is a 34 y.o. female who presents to the emergency department from home, as a walk in to the ED lobby for evaluation of sore throat, chest pain.    Patient states that her pain has been ongoing for the past 5 days.  States that she just awoke with pain with swallowing that has not gotten better.  She has switched her diet to soft foods still states that over the past day or 2 she has had significant difficulty even with things such as applesauce.  States that the pain with swallowing even occurs with her own saliva.  She does have a container at bedside that she is using to actively spit her secretions into however she is not drooling and is protecting her airway.  Friend at bedside states that the manner of her voice is changed to slightly deeper now than it was prior.  Patient states that she has supportive hydradenitis is on immune modulating medication for the past 3 weeks.  She denies any previous episodes of oral candidiasis or esophagitis.  Patient has not had any EGD in the past to evaluate for abnormality.  She states that she has had no surgery on her throat or chest.  Patient denies any fevers states that she has had subjective fevers and chills at home as well as some intermittent night sweats mild change to weight due to decreased appetite over the past few days.      The patient has no other acute complaints at this time.    ROS negative except as stated above.    PAST MEDICAL AND SURGICAL HISTORY     Past Medical History:   Diagnosis Date    Allergic rhinitis 11/10/2023    Anxiety

## 2025-06-16 NOTE — ED PROVIDER NOTES
Attending Supervising Physician's Attestation Statement  I performed a history and physical examination on the patient and discussed the management with the resident physician at 1235. I reviewed and agree with the findings and plan as documented in the resident physician note. This includes all diagnostic interpretations and treatment plans as written. I was present for the key portion of any procedures performed and the inclusive time noted in any critical care statement. Except as noted below.     Brief H&P   This patient is a 34 y.o. Female with complaints of chest pain and \"feeling a lump in her throat which started Wednesday of last week.  Patient is on immunotherapy for chronic hidradenitis.  She states that she is having difficulty swallowing her spit secondary to the pain.  She has tactile fevers as well as chills, she states that she thought that those were from her hidradenitis.  She denies any nausea or vomiting.    On my examination, nontoxic-appearing female, she is spitting into a soda bottle    HEENT: Normocephalic, Atraumatic. Neck is supple without TTP.  No lymphadenopathy, uvula is midline, no posterior pharyngeal erythema, or exudate  Lungs: Clear and equal bilaterally. No increased work of breathing or respiratory distress.  Heart: Rate and rhythm regular, no murmurs clicks or gallops  Abdomen: Soft non-tender, and non-distended abdomen. No rigidity. No Guarding.   Lower extremities: No edema, no tenderness to palpation.   Neuro: Awake and alert, no lateralizing deficits, cranial nerves II through XII grossly intact bilaterally    Diagnostics and Treatments      LABS / RADIOLOGY:     Labs Reviewed   CBC WITH AUTO DIFFERENTIAL - Abnormal; Notable for the following components:       Result Value    WBC 11.7 (*)     Hemoglobin 11.6 (*)     MCV 69.8 (*)     MCH 21.6 (*)     MCHC 31.0 (*)     RDW-CV 15.2 (*)     MPV 8.7 (*)     Neutrophils Absolute 8.4 (*)     Eosinophils Absolute 0.5 (*)     All

## 2025-06-16 NOTE — ED TRIAGE NOTES
Patient presents with concerns of chest pain and \"feeling a lump in her throat\" states this started Wednesday of last week. States she was not doing anything when this started. States she feels like there is a knot in her throat. States she is not able to swallow her own spit. Denies having this issue before.

## 2025-06-16 NOTE — DISCHARGE INSTRUCTIONS
You were seen and evaluated emergency department today for pain with swallowing over the past 4 to 5 days.  Your laboratory results were stable you declined testing for strep throat.  CT scan shows no emergent complication but there is thickening of your esophagus that needs further evaluation by gastroenterologist.  You were also noted to have a small hiatal hernia.  Please fill your prescriptions for Protonix and Carafate and take as prescribed to help decrease the acid production in your stomach and to help soothe your throat.    Follow-up with your primary care physician and gastroenterologist within the next 48 hours for further evaluation and management.    Return to ED at anytime for inability to eat or drink, severe pain uncontrolled at home with Tylenol or Motrin, significant change to your voice, any other worrisome changes or concerns.